# Patient Record
Sex: FEMALE | Race: WHITE | NOT HISPANIC OR LATINO | Employment: FULL TIME | URBAN - METROPOLITAN AREA
[De-identification: names, ages, dates, MRNs, and addresses within clinical notes are randomized per-mention and may not be internally consistent; named-entity substitution may affect disease eponyms.]

---

## 2022-05-24 ENCOUNTER — HOSPITAL ENCOUNTER (EMERGENCY)
Facility: CLINIC | Age: 27
Discharge: HOME | End: 2022-05-24
Attending: EMERGENCY MEDICINE | Admitting: EMERGENCY MEDICINE
Payer: COMMERCIAL

## 2022-05-24 ENCOUNTER — APPOINTMENT (OUTPATIENT)
Dept: GENERAL RADIOLOGY | Facility: CLINIC | Age: 27
End: 2022-05-24
Attending: EMERGENCY MEDICINE
Payer: COMMERCIAL

## 2022-05-24 VITALS
WEIGHT: 113 LBS | BODY MASS INDEX: 19.29 KG/M2 | RESPIRATION RATE: 18 BRPM | HEIGHT: 64 IN | SYSTOLIC BLOOD PRESSURE: 102 MMHG | DIASTOLIC BLOOD PRESSURE: 63 MMHG | OXYGEN SATURATION: 100 % | HEART RATE: 81 BPM | TEMPERATURE: 98.3 F

## 2022-05-24 DIAGNOSIS — K51.919 ULCERATIVE COLITIS WITH COMPLICATION, UNSPECIFIED LOCATION (H): ICD-10-CM

## 2022-05-24 DIAGNOSIS — R05.9 COUGH: ICD-10-CM

## 2022-05-24 LAB
ALBUMIN SERPL-MCNC: 3.1 G/DL (ref 3.4–5)
ALP SERPL-CCNC: 69 U/L (ref 40–150)
ALT SERPL W P-5'-P-CCNC: 16 U/L (ref 0–50)
ANION GAP SERPL CALCULATED.3IONS-SCNC: 5 MMOL/L (ref 3–14)
AST SERPL W P-5'-P-CCNC: 15 U/L (ref 0–45)
ATRIAL RATE - MUSE: 92 BPM
BASOPHILS # BLD AUTO: 0.1 10E3/UL (ref 0–0.2)
BASOPHILS NFR BLD AUTO: 1 %
BILIRUB SERPL-MCNC: 0.3 MG/DL (ref 0.2–1.3)
BUN SERPL-MCNC: 10 MG/DL (ref 7–30)
CALCIUM SERPL-MCNC: 8.1 MG/DL (ref 8.5–10.1)
CHLORIDE BLD-SCNC: 104 MMOL/L (ref 94–109)
CO2 SERPL-SCNC: 28 MMOL/L (ref 20–32)
CREAT SERPL-MCNC: 0.76 MG/DL (ref 0.52–1.04)
DIASTOLIC BLOOD PRESSURE - MUSE: NORMAL MMHG
EOSINOPHIL # BLD AUTO: 0 10E3/UL (ref 0–0.7)
EOSINOPHIL NFR BLD AUTO: 0 %
ERYTHROCYTE [DISTWIDTH] IN BLOOD BY AUTOMATED COUNT: 16.3 % (ref 10–15)
FERRITIN SERPL-MCNC: 2 NG/ML (ref 12–150)
GFR SERPL CREATININE-BSD FRML MDRD: >90 ML/MIN/1.73M2
GLUCOSE BLD-MCNC: 98 MG/DL (ref 70–99)
HCT VFR BLD AUTO: 29.8 % (ref 35–47)
HGB BLD-MCNC: 8.9 G/DL (ref 11.7–15.7)
IMM GRANULOCYTES # BLD: 0.1 10E3/UL
IMM GRANULOCYTES NFR BLD: 0 %
INTERPRETATION ECG - MUSE: NORMAL
IRON SATN MFR SERPL: 4 % (ref 15–46)
IRON SERPL-MCNC: 19 UG/DL (ref 35–180)
LYMPHOCYTES # BLD AUTO: 3.9 10E3/UL (ref 0.8–5.3)
LYMPHOCYTES NFR BLD AUTO: 34 %
MCH RBC QN AUTO: 21.8 PG (ref 26.5–33)
MCHC RBC AUTO-ENTMCNC: 29.9 G/DL (ref 31.5–36.5)
MCV RBC AUTO: 73 FL (ref 78–100)
MONOCYTES # BLD AUTO: 1.2 10E3/UL (ref 0–1.3)
MONOCYTES NFR BLD AUTO: 10 %
NEUTROPHILS # BLD AUTO: 6.2 10E3/UL (ref 1.6–8.3)
NEUTROPHILS NFR BLD AUTO: 55 %
NRBC # BLD AUTO: 0 10E3/UL
NRBC BLD AUTO-RTO: 0 /100
P AXIS - MUSE: 52 DEGREES
PLATELET # BLD AUTO: 480 10E3/UL (ref 150–450)
POTASSIUM BLD-SCNC: 3.9 MMOL/L (ref 3.4–5.3)
PR INTERVAL - MUSE: 118 MS
PROT SERPL-MCNC: 8.6 G/DL (ref 6.8–8.8)
QRS DURATION - MUSE: 74 MS
QT - MUSE: 358 MS
QTC - MUSE: 442 MS
R AXIS - MUSE: 65 DEGREES
RBC # BLD AUTO: 4.08 10E6/UL (ref 3.8–5.2)
RETICS # AUTO: 0.07 10E6/UL (ref 0.03–0.1)
RETICS/RBC NFR AUTO: 1.6 % (ref 0.5–2)
SODIUM SERPL-SCNC: 137 MMOL/L (ref 133–144)
SYSTOLIC BLOOD PRESSURE - MUSE: NORMAL MMHG
T AXIS - MUSE: 22 DEGREES
TIBC SERPL-MCNC: 457 UG/DL (ref 240–430)
TSH SERPL DL<=0.005 MIU/L-ACNC: 0.61 MU/L (ref 0.4–4)
VENTRICULAR RATE- MUSE: 92 BPM
WBC # BLD AUTO: 11.5 10E3/UL (ref 4–11)

## 2022-05-24 PROCEDURE — 99285 EMERGENCY DEPT VISIT HI MDM: CPT | Mod: 25

## 2022-05-24 PROCEDURE — 96360 HYDRATION IV INFUSION INIT: CPT

## 2022-05-24 PROCEDURE — 82728 ASSAY OF FERRITIN: CPT | Performed by: EMERGENCY MEDICINE

## 2022-05-24 PROCEDURE — 85025 COMPLETE CBC W/AUTO DIFF WBC: CPT | Performed by: EMERGENCY MEDICINE

## 2022-05-24 PROCEDURE — 83550 IRON BINDING TEST: CPT | Performed by: EMERGENCY MEDICINE

## 2022-05-24 PROCEDURE — 36415 COLL VENOUS BLD VENIPUNCTURE: CPT | Performed by: EMERGENCY MEDICINE

## 2022-05-24 PROCEDURE — 258N000003 HC RX IP 258 OP 636: Performed by: EMERGENCY MEDICINE

## 2022-05-24 PROCEDURE — 93005 ELECTROCARDIOGRAM TRACING: CPT

## 2022-05-24 PROCEDURE — 80053 COMPREHEN METABOLIC PANEL: CPT | Performed by: EMERGENCY MEDICINE

## 2022-05-24 PROCEDURE — 71046 X-RAY EXAM CHEST 2 VIEWS: CPT

## 2022-05-24 PROCEDURE — 85045 AUTOMATED RETICULOCYTE COUNT: CPT | Performed by: EMERGENCY MEDICINE

## 2022-05-24 PROCEDURE — 84443 ASSAY THYROID STIM HORMONE: CPT | Performed by: EMERGENCY MEDICINE

## 2022-05-24 RX ORDER — PREDNISONE 20 MG/1
TABLET ORAL
Qty: 11 TABLET | Refills: 0 | Status: SHIPPED | OUTPATIENT
Start: 2022-05-24 | End: 2022-06-03

## 2022-05-24 RX ADMIN — SODIUM CHLORIDE 1000 ML: 9 INJECTION, SOLUTION INTRAVENOUS at 18:00

## 2022-05-24 ASSESSMENT — ENCOUNTER SYMPTOMS
HEADACHES: 0
SHORTNESS OF BREATH: 0
FEVER: 0
APPETITE CHANGE: 1
VOMITING: 0
CHILLS: 0
PALPITATIONS: 1
COUGH: 1
MYALGIAS: 0
DIARRHEA: 0

## 2022-05-24 NOTE — ED TRIAGE NOTES
Pt sent from  for further evaluation. Pt reports cough for approx 1 month that is getting worse and HR that is normally running in the 130s. Pt reports feeling tired.      Triage Assessment     Row Name 05/24/22 1531       Triage Assessment (Adult)    Airway WDL WDL       Respiratory WDL    Respiratory WDL X;cough    Cough Type congested       Skin Circulation/Temperature WDL    Skin Circulation/Temperature WDL WDL       Cardiac WDL    Cardiac WDL X;rhythm  Reports tachycardia up to 130s at times       Peripheral/Neurovascular WDL    Peripheral Neurovascular WDL WDL       Cognitive/Neuro/Behavioral WDL    Cognitive/Neuro/Behavioral WDL WDL

## 2022-05-24 NOTE — ED PROVIDER NOTES
History   Chief Complaint:  Cough      HPI   Kelley Abad is a 26 year old female with history of asthma and ulcerative colitis who presents alone for evaluation of cough and palpitations. The patient states that she has had tachycardia over the last 4 months. She notes that she has had some associated dizziness over the last week. She is not as concerned with her tachycardia however. She also notes that she has had a cough for the last month which she is more concerned about. The cough has been consistent over the last month. She notes that has associated congestion. She has been using her inhaler more over the last 2 weeks. She was seen at urgent care today and sent to the ED for her tachycardia. She notes that she has a decreased appetite recently and has been eating less. The patient denies chest pain, chest pressure, leg swelling, shortness of breath or pain with a deep breath. No fever, chills, headache, myalgias, vomiting.  States she is also currently having a flare of her ulcerative colitis.  She denies chance of pregnancy. She has an IUD implant.      Review of Systems   Constitutional: Positive for appetite change (Decreased). Negative for chills and fever.   HENT: Positive for congestion.    Respiratory: Positive for cough. Negative for shortness of breath.    Cardiovascular: Positive for palpitations (Tachycardia). Negative for chest pain and leg swelling.   Gastrointestinal: Negative for diarrhea and vomiting.   Musculoskeletal: Negative for myalgias.   Neurological: Negative for headaches.   All other systems reviewed and are negative.      Allergies:  The patient has no known allergies.     Medications:  Lexapro  Albuterol     Past Medical History:     Asthma  Ulcerative colitis   IUD implant    Surgical history:  Endoscopy  Colonoscopy     Social History:  The patient presents to the ED alone.   PCP: Marilin Hurley MD    Physical Exam     Patient Vitals for the past 24 hrs:   BP Temp Temp src  "Pulse Resp SpO2 Height Weight   05/24/22 1842 102/63 -- -- 81 18 100 % -- --   05/24/22 1530 116/72 98.3  F (36.8  C) Temporal 88 16 100 % 1.626 m (5' 4\") 51.3 kg (113 lb)       Physical Exam  Eyes:               Sclera white; Pupils are equal and round  ENT:                External ears and nares normal  CV:                  Rate as above with regular rhythm   Resp:               Breath sounds clear and equal bilaterally, no wheeze including forced exhalation, occasional cough                          Non-labored, no retractions or accessory muscle use  GI:                   Abdomen is soft, non-tender, non-distended                          No rebound tenderness or peritoneal features  MS:                  Moves all extremities, no BLE edema  Skin:                Warm and dry  Neuro:             Speech is normal and fluent. No apparent deficit.    Emergency Department Course   ECG  ECG taken at 1539, ECG read at 1717  NSR with sinus arrhythmia  Normal ECG   Rate 92 bpm. TX interval 118 ms. QRS duration 74 ms. QT/QTc 358/442 ms. P-R-T axes 52 65 22.       Imaging:  XR Chest 2 Views   Final Result   IMPRESSION: Negative chest.        Report per radiology    Laboratory:  Labs Ordered and Resulted from Time of ED Arrival to Time of ED Departure   COMPREHENSIVE METABOLIC PANEL - Abnormal       Result Value    Sodium 137      Potassium 3.9      Chloride 104      Carbon Dioxide (CO2) 28      Anion Gap 5      Urea Nitrogen 10      Creatinine 0.76      Calcium 8.1 (*)     Glucose 98      Alkaline Phosphatase 69      AST 15      ALT 16      Protein Total 8.6      Albumin 3.1 (*)     Bilirubin Total 0.3      GFR Estimate >90     CBC WITH PLATELETS AND DIFFERENTIAL - Abnormal    WBC Count 11.5 (*)     RBC Count 4.08      Hemoglobin 8.9 (*)     Hematocrit 29.8 (*)     MCV 73 (*)     MCH 21.8 (*)     MCHC 29.9 (*)     RDW 16.3 (*)     Platelet Count 480 (*)     % Neutrophils 55      % Lymphocytes 34      % Monocytes 10      % " Eosinophils 0      % Basophils 1      % Immature Granulocytes 0      NRBCs per 100 WBC 0      Absolute Neutrophils 6.2      Absolute Lymphocytes 3.9      Absolute Monocytes 1.2      Absolute Eosinophils 0.0      Absolute Basophils 0.1      Absolute Immature Granulocytes 0.1      Absolute NRBCs 0.0     TSH WITH FREE T4 REFLEX - Normal    TSH 0.61     RETICULOCYTE COUNT        Emergency Department Course:             Reviewed:  I reviewed nursing notes, vitals, past medical history and Care Everywhere    Assessments:  1709 I obtained history and examined the patient as noted above.   1825 I rechecked the patient and explained findings, plan of care was discussed.       Interventions:  1800 NS, 1 L, IV      Disposition:  The patient was discharged to home.     Impression & Plan     Medical Decision Making:  EKG w/o ischemia, dysrhythmia, or pericarditis.  She is not tachycardic on arrival here.  She does not have any findings of a DVT and symptoms sound infectious.  Work-up for pulmonary embolism is not appropriate and is not undertaken.  There is no evidence for pneumonia.  She endorses chronic anemia.  Received IV fluids for suspected dehydration.  We discussed the potential role for antibiotics and subacute bronchitis versus the effect it may have on her ulcerative colitis.  Steroid taper prescribed but will help with both her ulcerative colitis flare and hopefully help with her respiratory symptoms as well.  Antibiotics were not prescribed      Diagnosis:    ICD-10-CM    1. Cough  R05.9    2. Ulcerative colitis with complication, unspecified location (H)  K51.919        Discharge Medications:  Discharge Medication List as of 5/24/2022  6:37 PM      START taking these medications    Details   predniSONE (DELTASONE) 20 MG tablet 2 tabs day 1-3, then 1 tab days 4-6, then 1/2 tab daily for 4 days, Disp-11 tablet, R-0, E-Prescribe             Scribe Disclosure:  Tavo SEPULVEDA, am serving as a scribe at 5:08 PM on  5/24/2022 to document services personally performed by Maegan White MD based on my observations and the provider's statements to me.            Maegan White MD  05/26/22 1604       Maegan White MD  05/26/22 8110

## 2022-07-10 ENCOUNTER — HEALTH MAINTENANCE LETTER (OUTPATIENT)
Age: 27
End: 2022-07-10

## 2022-09-11 ENCOUNTER — HEALTH MAINTENANCE LETTER (OUTPATIENT)
Age: 27
End: 2022-09-11

## 2023-07-29 ENCOUNTER — HEALTH MAINTENANCE LETTER (OUTPATIENT)
Age: 28
End: 2023-07-29

## 2024-09-21 ENCOUNTER — HEALTH MAINTENANCE LETTER (OUTPATIENT)
Age: 29
End: 2024-09-21